# Patient Record
Sex: FEMALE | Race: WHITE | URBAN - METROPOLITAN AREA
[De-identification: names, ages, dates, MRNs, and addresses within clinical notes are randomized per-mention and may not be internally consistent; named-entity substitution may affect disease eponyms.]

---

## 2017-08-08 ENCOUNTER — COMMUNICATION - HEALTHEAST (OUTPATIENT)
Dept: INTERNAL MEDICINE | Facility: CLINIC | Age: 52
End: 2017-08-08

## 2017-08-15 ENCOUNTER — HOSPITAL ENCOUNTER (OUTPATIENT)
Dept: MAMMOGRAPHY | Facility: CLINIC | Age: 52
Discharge: HOME | End: 2017-08-15
Attending: FAMILY MEDICINE

## 2017-08-15 DIAGNOSIS — Z12.31 VISIT FOR SCREENING MAMMOGRAM: ICD-10-CM

## 2017-11-06 ENCOUNTER — OFFICE VISIT - HEALTHEAST (OUTPATIENT)
Dept: INTERNAL MEDICINE | Facility: CLINIC | Age: 52
End: 2017-11-06

## 2017-11-06 DIAGNOSIS — E66.9 OBESITY: ICD-10-CM

## 2017-11-06 DIAGNOSIS — E11.9 DIABETES (H): ICD-10-CM

## 2017-11-06 DIAGNOSIS — E55.9 VITAMIN D DEFICIENCY: ICD-10-CM

## 2017-11-06 DIAGNOSIS — I10 HYPERTENSION: ICD-10-CM

## 2017-11-06 ASSESSMENT — MIFFLIN-ST. JEOR: SCORE: 1489.11

## 2018-11-05 ENCOUNTER — OFFICE VISIT - HEALTHEAST (OUTPATIENT)
Dept: INTERNAL MEDICINE | Facility: CLINIC | Age: 53
End: 2018-11-05

## 2018-11-05 DIAGNOSIS — Z00.00 HEALTH CARE MAINTENANCE: ICD-10-CM

## 2018-11-05 ASSESSMENT — MIFFLIN-ST. JEOR: SCORE: 1524.03

## 2018-11-08 LAB
BKR LAB AP ABNORMAL BLEEDING: NO
BKR LAB AP BIRTH CONTROL/HORMONES: NORMAL
BKR LAB AP CERVICAL APPEARANCE: YES
BKR LAB AP GYN ADEQUACY: NORMAL
BKR LAB AP GYN INTERPRETATION: NORMAL
BKR LAB AP HPV REFLEX: NORMAL
BKR LAB AP LMP: NORMAL
BKR LAB AP PATIENT STATUS: NORMAL
BKR LAB AP PREVIOUS ABNORMAL: NORMAL
BKR LAB AP PREVIOUS NORMAL: NORMAL
HIGH RISK?: NO
PATH REPORT.COMMENTS IMP SPEC: NORMAL
RESULT FLAG (HE HISTORICAL CONVERSION): NORMAL

## 2019-04-12 ENCOUNTER — RECORDS - HEALTHEAST (OUTPATIENT)
Dept: ADMINISTRATIVE | Facility: OTHER | Age: 54
End: 2019-04-12

## 2019-08-28 ENCOUNTER — HOSPITAL ENCOUNTER (OUTPATIENT)
Dept: MAMMOGRAPHY | Facility: CLINIC | Age: 54
Discharge: HOME | End: 2019-08-28
Attending: INTERNAL MEDICINE

## 2019-08-28 DIAGNOSIS — Z12.31 VISIT FOR SCREENING MAMMOGRAM: ICD-10-CM

## 2019-09-03 ENCOUNTER — HOSPITAL ENCOUNTER (OUTPATIENT)
Dept: ULTRASOUND IMAGING | Facility: CLINIC | Age: 54
Discharge: HOME | End: 2019-09-03
Attending: INTERNAL MEDICINE

## 2019-09-03 ENCOUNTER — HOSPITAL ENCOUNTER (OUTPATIENT)
Dept: MAMMOGRAPHY | Facility: CLINIC | Age: 54
Discharge: HOME | End: 2019-09-03
Attending: INTERNAL MEDICINE

## 2019-09-03 DIAGNOSIS — N64.89 BREAST ASYMMETRY: ICD-10-CM

## 2019-09-14 ENCOUNTER — RECORDS - HEALTHEAST (OUTPATIENT)
Dept: ADMINISTRATIVE | Facility: OTHER | Age: 54
End: 2019-09-14

## 2020-06-30 ENCOUNTER — COMMUNICATION - HEALTHEAST (OUTPATIENT)
Dept: INTERNAL MEDICINE | Facility: CLINIC | Age: 55
End: 2020-06-30

## 2020-06-30 DIAGNOSIS — N60.01 BREAST CYST, RIGHT: ICD-10-CM

## 2020-06-30 DIAGNOSIS — Z00.00 ROUTINE GENERAL MEDICAL EXAMINATION AT A HEALTH CARE FACILITY: ICD-10-CM

## 2020-07-09 ENCOUNTER — OFFICE VISIT - HEALTHEAST (OUTPATIENT)
Dept: FAMILY MEDICINE | Facility: CLINIC | Age: 55
End: 2020-07-09

## 2020-07-09 DIAGNOSIS — Z12.4 SCREENING FOR MALIGNANT NEOPLASM OF CERVIX: ICD-10-CM

## 2020-07-10 LAB
HPV SOURCE: NORMAL
HUMAN PAPILLOMA VIRUS 16 DNA: NEGATIVE
HUMAN PAPILLOMA VIRUS 18 DNA: NEGATIVE
HUMAN PAPILLOMA VIRUS FINAL DIAGNOSIS: NORMAL
HUMAN PAPILLOMA VIRUS OTHER HR: NEGATIVE
SPECIMEN DESCRIPTION: NORMAL

## 2020-07-15 ENCOUNTER — RECORDS - HEALTHEAST (OUTPATIENT)
Dept: ADMINISTRATIVE | Facility: OTHER | Age: 55
End: 2020-07-15

## 2020-07-21 ENCOUNTER — HOSPITAL ENCOUNTER (OUTPATIENT)
Dept: MAMMOGRAPHY | Facility: CLINIC | Age: 55
Discharge: HOME | End: 2020-07-21
Attending: INTERNAL MEDICINE

## 2020-07-21 ENCOUNTER — HOSPITAL ENCOUNTER (OUTPATIENT)
Dept: ULTRASOUND IMAGING | Facility: CLINIC | Age: 55
Discharge: HOME | End: 2020-07-21
Attending: INTERNAL MEDICINE

## 2020-07-21 DIAGNOSIS — Z09 FOLLOW-UP EXAM, 3-6 MONTHS SINCE PREVIOUS EXAM: ICD-10-CM

## 2020-07-21 DIAGNOSIS — N60.01 BREAST CYST, RIGHT: ICD-10-CM

## 2020-08-18 ENCOUNTER — COMMUNICATION - HEALTHEAST (OUTPATIENT)
Dept: INTERNAL MEDICINE | Facility: CLINIC | Age: 55
End: 2020-08-18

## 2020-08-21 ENCOUNTER — OFFICE VISIT - HEALTHEAST (OUTPATIENT)
Dept: INTERNAL MEDICINE | Facility: CLINIC | Age: 55
End: 2020-08-21

## 2020-08-21 DIAGNOSIS — Z00.00 ROUTINE GENERAL MEDICAL EXAMINATION AT A HEALTH CARE FACILITY: ICD-10-CM

## 2020-08-21 DIAGNOSIS — Z71.6 ENCOUNTER FOR SMOKING CESSATION COUNSELING: ICD-10-CM

## 2020-08-21 DIAGNOSIS — E66.01 MORBID OBESITY (H): ICD-10-CM

## 2020-08-21 DIAGNOSIS — I10 ESSENTIAL HYPERTENSION: ICD-10-CM

## 2020-08-21 LAB
ALBUMIN SERPL-MCNC: 3.9 G/DL (ref 3.5–5)
ALP SERPL-CCNC: 66 U/L (ref 45–120)
ALT SERPL W P-5'-P-CCNC: 14 U/L (ref 0–45)
ANION GAP SERPL CALCULATED.3IONS-SCNC: 10 MMOL/L (ref 5–18)
AST SERPL W P-5'-P-CCNC: 12 U/L (ref 0–40)
BILIRUB SERPL-MCNC: 0.3 MG/DL (ref 0–1)
BUN SERPL-MCNC: 14 MG/DL (ref 8–22)
CALCIUM SERPL-MCNC: 9.2 MG/DL (ref 8.5–10.5)
CHLORIDE BLD-SCNC: 107 MMOL/L (ref 98–107)
CHOLEST SERPL-MCNC: 176 MG/DL
CO2 SERPL-SCNC: 24 MMOL/L (ref 22–31)
CREAT SERPL-MCNC: 0.77 MG/DL (ref 0.6–1.1)
ERYTHROCYTE [DISTWIDTH] IN BLOOD BY AUTOMATED COUNT: 11.8 % (ref 11–14.5)
FASTING STATUS PATIENT QL REPORTED: YES
GFR SERPL CREATININE-BSD FRML MDRD: >60 ML/MIN/1.73M2
GLUCOSE BLD-MCNC: 101 MG/DL (ref 70–125)
HCT VFR BLD AUTO: 36 % (ref 35–47)
HDLC SERPL-MCNC: 59 MG/DL
HGB BLD-MCNC: 12.5 G/DL (ref 12–16)
LDLC SERPL CALC-MCNC: 81 MG/DL
MCH RBC QN AUTO: 32.9 PG (ref 27–34)
MCHC RBC AUTO-ENTMCNC: 34.6 G/DL (ref 32–36)
MCV RBC AUTO: 95 FL (ref 80–100)
PLATELET # BLD AUTO: 298 THOU/UL (ref 140–440)
PMV BLD AUTO: 7.8 FL (ref 7–10)
POTASSIUM BLD-SCNC: 4.3 MMOL/L (ref 3.5–5)
PROT SERPL-MCNC: 6.7 G/DL (ref 6–8)
RBC # BLD AUTO: 3.79 MILL/UL (ref 3.8–5.4)
SODIUM SERPL-SCNC: 141 MMOL/L (ref 136–145)
TRIGL SERPL-MCNC: 182 MG/DL
WBC: 4.1 THOU/UL (ref 4–11)

## 2020-08-21 RX ORDER — TOPIRAMATE 25 MG/1
50 TABLET, FILM COATED ORAL EVERY MORNING
Status: SHIPPED | COMMUNITY
Start: 2020-08-05

## 2020-08-24 LAB — HCV AB SERPL QL IA: NEGATIVE

## 2020-09-01 ENCOUNTER — COMMUNICATION - HEALTHEAST (OUTPATIENT)
Dept: INTERNAL MEDICINE | Facility: CLINIC | Age: 55
End: 2020-09-01

## 2020-09-01 ENCOUNTER — COMMUNICATION - HEALTHEAST (OUTPATIENT)
Dept: SCHEDULING | Facility: CLINIC | Age: 55
End: 2020-09-01

## 2020-09-01 ENCOUNTER — OFFICE VISIT - HEALTHEAST (OUTPATIENT)
Dept: INTERNAL MEDICINE | Facility: CLINIC | Age: 55
End: 2020-09-01

## 2020-09-01 DIAGNOSIS — R60.9 EDEMA, UNSPECIFIED TYPE: ICD-10-CM

## 2020-09-01 DIAGNOSIS — I10 ESSENTIAL HYPERTENSION: ICD-10-CM

## 2020-09-01 RX ORDER — FUROSEMIDE 20 MG
20 TABLET ORAL DAILY PRN
Qty: 30 TABLET | Refills: 0 | Status: SHIPPED | OUTPATIENT
Start: 2020-09-01 | End: 2022-01-11

## 2020-09-15 ENCOUNTER — RECORDS - HEALTHEAST (OUTPATIENT)
Dept: ADMINISTRATIVE | Facility: OTHER | Age: 55
End: 2020-09-15

## 2020-10-06 ENCOUNTER — COMMUNICATION - HEALTHEAST (OUTPATIENT)
Dept: INTERNAL MEDICINE | Facility: CLINIC | Age: 55
End: 2020-10-06

## 2020-10-06 DIAGNOSIS — D17.30 LIPOMA OF SKIN AND SUBCUTANEOUS TISSUE: ICD-10-CM

## 2020-10-22 ENCOUNTER — OFFICE VISIT - HEALTHEAST (OUTPATIENT)
Dept: SURGERY | Facility: CLINIC | Age: 55
End: 2020-10-22

## 2020-10-22 DIAGNOSIS — R22.2 PALPABLE MASS OF LOWER BACK: ICD-10-CM

## 2020-10-26 LAB
LAB AP CHARGES (HE HISTORICAL CONVERSION): NORMAL
PATH REPORT.COMMENTS IMP SPEC: NORMAL
PATH REPORT.FINAL DX SPEC: NORMAL
PATH REPORT.GROSS SPEC: NORMAL
PATH REPORT.MICROSCOPIC SPEC OTHER STN: NORMAL
PATH REPORT.RELEVANT HX SPEC: NORMAL
RESULT FLAG (HE HISTORICAL CONVERSION): NORMAL

## 2020-11-03 ENCOUNTER — RECORDS - HEALTHEAST (OUTPATIENT)
Dept: ADMINISTRATIVE | Facility: OTHER | Age: 55
End: 2020-11-03

## 2021-02-15 ENCOUNTER — COMMUNICATION - HEALTHEAST (OUTPATIENT)
Dept: ADMINISTRATIVE | Facility: CLINIC | Age: 56
End: 2021-02-15

## 2021-02-15 DIAGNOSIS — J32.9 SINUSITIS, UNSPECIFIED CHRONICITY, UNSPECIFIED LOCATION: ICD-10-CM

## 2021-02-24 ENCOUNTER — COMMUNICATION - HEALTHEAST (OUTPATIENT)
Dept: ADMINISTRATIVE | Facility: CLINIC | Age: 56
End: 2021-02-24

## 2021-02-24 DIAGNOSIS — J32.9 SINUSITIS, UNSPECIFIED CHRONICITY, UNSPECIFIED LOCATION: ICD-10-CM

## 2021-03-01 ENCOUNTER — HOSPITAL ENCOUNTER (OUTPATIENT)
Dept: ULTRASOUND IMAGING | Facility: CLINIC | Age: 56
Discharge: HOME | End: 2021-03-01
Attending: INTERNAL MEDICINE

## 2021-03-01 ENCOUNTER — HOSPITAL ENCOUNTER (OUTPATIENT)
Dept: MAMMOGRAPHY | Facility: CLINIC | Age: 56
Discharge: HOME | End: 2021-03-01
Attending: INTERNAL MEDICINE

## 2021-03-01 DIAGNOSIS — Z09 FOLLOW UP: ICD-10-CM

## 2021-03-08 ENCOUNTER — COMMUNICATION - HEALTHEAST (OUTPATIENT)
Dept: INTERNAL MEDICINE | Facility: CLINIC | Age: 56
End: 2021-03-08

## 2021-03-08 ENCOUNTER — OFFICE VISIT - HEALTHEAST (OUTPATIENT)
Dept: INTERNAL MEDICINE | Facility: CLINIC | Age: 56
End: 2021-03-08

## 2021-03-08 DIAGNOSIS — E66.9 OBESITY WITHOUT SERIOUS COMORBIDITY, UNSPECIFIED CLASSIFICATION, UNSPECIFIED OBESITY TYPE: ICD-10-CM

## 2021-03-08 DIAGNOSIS — Z71.6 ENCOUNTER FOR SMOKING CESSATION COUNSELING: ICD-10-CM

## 2021-03-08 DIAGNOSIS — S83.249S TEAR OF MEDIAL MENISCUS OF KNEE, UNSPECIFIED LATERALITY, UNSPECIFIED TEAR TYPE, UNSPECIFIED WHETHER OLD OR CURRENT TEAR, SEQUELA: ICD-10-CM

## 2021-03-08 DIAGNOSIS — Z01.818 PRE-OP EXAM: ICD-10-CM

## 2021-03-08 DIAGNOSIS — I10 ESSENTIAL HYPERTENSION: ICD-10-CM

## 2021-03-08 LAB
ANION GAP SERPL CALCULATED.3IONS-SCNC: 9 MMOL/L (ref 5–18)
ATRIAL RATE - MUSE: 75 BPM
BUN SERPL-MCNC: 16 MG/DL (ref 8–22)
CALCIUM SERPL-MCNC: 9 MG/DL (ref 8.5–10.5)
CHLORIDE BLD-SCNC: 107 MMOL/L (ref 98–107)
CO2 SERPL-SCNC: 24 MMOL/L (ref 22–31)
CREAT SERPL-MCNC: 0.81 MG/DL (ref 0.6–1.1)
DIASTOLIC BLOOD PRESSURE - MUSE: NORMAL
GFR SERPL CREATININE-BSD FRML MDRD: >60 ML/MIN/1.73M2
GLUCOSE BLD-MCNC: 96 MG/DL (ref 70–125)
INTERPRETATION ECG - MUSE: NORMAL
P AXIS - MUSE: 56 DEGREES
POTASSIUM BLD-SCNC: 4.3 MMOL/L (ref 3.5–5)
PR INTERVAL - MUSE: 136 MS
QRS DURATION - MUSE: 98 MS
QT - MUSE: 388 MS
QTC - MUSE: 433 MS
R AXIS - MUSE: 7 DEGREES
SODIUM SERPL-SCNC: 140 MMOL/L (ref 136–145)
SYSTOLIC BLOOD PRESSURE - MUSE: NORMAL
T AXIS - MUSE: 38 DEGREES
VENTRICULAR RATE- MUSE: 75 BPM

## 2021-03-08 RX ORDER — VARENICLINE TARTRATE 1 MG/1
TABLET, FILM COATED ORAL
Qty: 60 TABLET | Refills: 2 | Status: SHIPPED | OUTPATIENT
Start: 2021-03-08 | End: 2022-01-11

## 2021-03-12 ENCOUNTER — COMMUNICATION - HEALTHEAST (OUTPATIENT)
Dept: ADMINISTRATIVE | Facility: CLINIC | Age: 56
End: 2021-03-12

## 2021-03-15 ENCOUNTER — RECORDS - HEALTHEAST (OUTPATIENT)
Dept: ADMINISTRATIVE | Facility: OTHER | Age: 56
End: 2021-03-15

## 2021-05-29 ENCOUNTER — RECORDS - HEALTHEAST (OUTPATIENT)
Dept: ADMINISTRATIVE | Facility: CLINIC | Age: 56
End: 2021-05-29

## 2021-05-31 ENCOUNTER — RECORDS - HEALTHEAST (OUTPATIENT)
Dept: ADMINISTRATIVE | Facility: CLINIC | Age: 56
End: 2021-05-31

## 2021-05-31 VITALS — WEIGHT: 207 LBS | HEIGHT: 63 IN | BODY MASS INDEX: 36.68 KG/M2

## 2021-06-02 ENCOUNTER — RECORDS - HEALTHEAST (OUTPATIENT)
Dept: ADMINISTRATIVE | Facility: CLINIC | Age: 56
End: 2021-06-02

## 2021-06-02 VITALS — BODY MASS INDEX: 38.04 KG/M2 | HEIGHT: 63 IN | WEIGHT: 214.7 LBS

## 2021-06-04 VITALS
SYSTOLIC BLOOD PRESSURE: 128 MMHG | DIASTOLIC BLOOD PRESSURE: 82 MMHG | OXYGEN SATURATION: 97 % | BODY MASS INDEX: 34.46 KG/M2 | WEIGHT: 193 LBS | HEART RATE: 97 BPM

## 2021-06-04 VITALS
DIASTOLIC BLOOD PRESSURE: 83 MMHG | HEART RATE: 74 BPM | BODY MASS INDEX: 33.93 KG/M2 | SYSTOLIC BLOOD PRESSURE: 122 MMHG | WEIGHT: 190 LBS

## 2021-06-04 VITALS
SYSTOLIC BLOOD PRESSURE: 118 MMHG | HEART RATE: 68 BPM | DIASTOLIC BLOOD PRESSURE: 78 MMHG | BODY MASS INDEX: 33.93 KG/M2 | WEIGHT: 190 LBS

## 2021-06-05 VITALS
HEART RATE: 80 BPM | DIASTOLIC BLOOD PRESSURE: 78 MMHG | BODY MASS INDEX: 35.35 KG/M2 | SYSTOLIC BLOOD PRESSURE: 132 MMHG | WEIGHT: 198 LBS

## 2021-06-09 NOTE — TELEPHONE ENCOUNTER
Orders being requested: patient is not sure if she needs repeat US to breasts or 3D mammogram.  The result letter from [facility] states it is up to the provider. dated 9/3/19. Leigh would like to schedule in July now that the pandemic has advanced.  She was due for follow up . Please advise.   Reason service is needed/diagnosis:  abnormal mammogram  When are orders needed by: asap  Where to send Orders: in chart. [facility] related to patient they need a new order.   Okay to leave detailed message?  Yes

## 2021-06-09 NOTE — PROGRESS NOTES
Assessment/Plan:     1. Screening for malignant neoplasm of cervix  Pap pending.  Will call or send letter with results.  If normal, recommend repeating in 5 years.  Discussed menopausal symptoms.    - Gynecologic Cytology (PAP Smear)        Subjective:     Mrago Jones is a 55 y.o. female who presents for a pelvic and pap exam.  She is scheduled for a physical with her PCP next month.  Patient's last Pap was about 2 years ago.  We did discuss that these only need to be repeated every 3 to 5 years, but patient prefers to do this every 2 years.  States she was on a lot of infertility medications, and feels better with more frequent screening.  Last Pap and HPV testing were both negative.  No history of abnormal.  No personal or family history of uterine or cervical cancer.  Last menstrual period was about 6 months ago.  She is starting to get some hot flashes and night sweats.  She is able to manage these at this time.  Denies any abnormal vaginal discharge or pelvic pain.      The following portions of the patient's history were reviewed and updated as appropriate: allergies, current medications.    Review of Systems  A comprehensive review of systems was performed and was otherwise negative    Objective:     /78   Pulse 68   Wt 190 lb (86.2 kg)   LMP 08/19/2019   BMI 33.93 kg/m      General Appearance: Alert, cooperative, no distress, appears stated age  Pelvic: Normally developed genitalia with no external lesions or eruptions. Vagina and cervix show no lesions, inflammation, discharge or tenderness. No cystocele, No rectocele. No adnexal mass or tenderness.        Carmella Alston NP

## 2021-06-10 NOTE — TELEPHONE ENCOUNTER
New Appointment Needed  What is the reason for the visit:    Annual Phys   Provider Preference: PCP only  How soon do you need to be seen?: 8/24 4:20 appointment for spouse is already scheduled. Patient is requesting a back to back  Waitlist offered?: No  Okay to leave a detailed message:  Yes

## 2021-06-10 NOTE — PROGRESS NOTES
Assessment:      Healthy female exam.      Smoking cessation    Obesity, with a 25 pound weight loss over the last year and a half.  She continues to see [facility] for this.       Plan:     Check a CBC, CMP, lipid profile, and hep C antibody.  Colon cancer screening is up-to-date.  Mammogram is up-to-date.  Pap is up-to-date.  Follow-up 1 year, earlier if needed.    Subjective:      Margo Jones is a 55 y.o. female who presents for an annual exam.  She is overall feeling well today and has no acute complaints.  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  She says that there is a lot of stress between the teachers and administration because of the coronavirus pandemic which is completely understandable.  Her weight has gone down significantly over the last couple of years.  Blood pressure is under excellent control today.      Immunization History   Administered Date(s) Administered     DT (pediatric) 2005     Influenza [id number], 2009     Influenza, seasonal,quad inj 6-35 mos 2013     Td,adult,historic,unspecified 2005     Tdap 2013     Immunization status: up to date and documented.    No exam data present    OB History    Para Term  AB Living   2 2 2         SAB TAB Ectopic Multiple Live Births                  # Outcome Date GA Lbr Martin/2nd Weight Sex Delivery Anes PTL Lv   2 Term            1 Term                Current Outpatient Medications   Medication Sig Dispense Refill     losartan (COZAAR) 25 MG tablet Take 25 mg by mouth daily.       metFORMIN (GLUCOPHAGE-XR) 500 MG 24 hr tablet Take 2,000 mg by mouth bedtime.       multivitamin with minerals (THERA-M) 9 mg iron-400 mcg Tab tablet Take 1 tablet by mouth daily.       naproxen sodium (ALEVE ORAL) Take by mouth 2 (two) times a day.       phentermine 30 MG capsule Take 45 mg by mouth every morning.       simvastatin  (ZOCOR) 20 MG tablet Take 20 mg by mouth bedtime.       topiramate (TOPAMAX) 25 MG tablet Take 50 mg by mouth daily before breakfast.       varenicline (CHANTIX STARTING MONTH BOX) 0.5 mg (11)- 1 mg (42) tablet 1 wk before you stop smoking take 0.5mg daily on days 1-3, 0.5mg 2 times each day on days 4-7, then 1mg 2 times daily. 53 tablet 0     varenicline (CHANTIX) 1 mg tablet Take 1 tab by mouth two times a day. Take after eating with a full glass of water. NOTE: Dispense as maintenance for refills only. 60 tablet 2     No current facility-administered medications for this visit.      No past medical history on file.  Past Surgical History:   Procedure Laterality Date     ND LIGATE FALLOPIAN TUBE      Description: Tubal Ligation;  Recorded: 2010;     Patient has no known allergies.  Family History   Problem Relation Age of Onset     No Medical Problems Mother      Colon cancer Father      No Medical Problems Sister      No Medical Problems Brother      No Medical Problems Daughter      No Medical Problems Son      Heart attack Paternal Grandfather      No Medical Problems Brother      No Medical Problems Sister      Social History     Socioeconomic History     Marital status: Patient Declined     Spouse name: Not on file     Number of children: Not on file     Years of education: Not on file     Highest education level: Not on file   Occupational History     Not on file   Social Needs     Financial resource strain: Not on file     Food insecurity     Worry: Not on file     Inability: Not on file     Transportation needs     Medical: Not on file     Non-medical: Not on file   Tobacco Use     Smoking status: Former Smoker     Packs/day: 0.25     Years: 30.00     Pack years: 7.50     Last attempt to quit: 2/3/2020     Years since quittin.5     Smokeless tobacco: Never Used     Tobacco comment: Smokes 5 cigarettes daily, plan to quite after surgery   Substance and Sexual Activity     Alcohol use: Yes      Alcohol/week: 14.0 standard drinks     Types: 14 Glasses of wine per week     Drug use: No     Sexual activity: Yes     Partners: Male     Birth control/protection: None   Lifestyle     Physical activity     Days per week: Not on file     Minutes per session: Not on file     Stress: Not on file   Relationships     Social connections     Talks on phone: Not on file     Gets together: Not on file     Attends Congregational service: Not on file     Active member of club or organization: Not on file     Attends meetings of clubs or organizations: Not on file     Relationship status: Not on file     Intimate partner violence     Fear of current or ex partner: Not on file     Emotionally abused: Not on file     Physically abused: Not on file     Forced sexual activity: Not on file   Other Topics Concern     Not on file   Social History Narrative     Not on file       Review of Systems  General:  Denies problem  Eyes: Denies problem  Ears/Nose/Throat: Denies problem  Cardiovascular: Denies problem  Respiratory:  Denies problem  Gastrointestinal:  Denies problem, Genitourinary: Denies problem  Musculoskeletal:  Denies problem  Skin: Denies problem  Neurologic: Denies problem  Psychiatric: Denies problem  Endocrine: Denies problem  Heme/Lymphatic: Denies problem   Allergic/Immunologic: Denies problem        Objective:         Vitals:    08/21/20 0934   BP: 122/83   Pulse: 74   Weight: 190 lb (86.2 kg)     Body mass index is 33.93 kg/m .    Physical Exam:  General Appearance: Alert, cooperative, no distress, appears stated age  Head: Normocephalic, without obvious abnormality, atraumatic  Eyes: PERRL, conjunctiva/corneas clear, EOM's intact  Ears: Normal TM's and external ear canals, both ears  Nose: Nares normal, septum midline,mucosa normal, no drainage  Throat: Lips, mucosa, and tongue normal; teeth and gums normal  Neck: Supple, symmetrical, trachea midline, no adenopathy;  thyroid: not enlarged, symmetric, no  tenderness/mass/nodules; no carotid bruit or JVD  Back: Symmetric, no curvature, ROM normal, no CVA tenderness  Lungs: Clear to auscultation bilaterally, respirations unlabored  Breasts: No breast masses, tenderness, asymmetry, or nipple discharge.  Heart: Regular rate and rhythm, S1 and S2 normal, no murmur, rub, or gallop, Abdomen: Soft, non-tender, bowel sounds active all four quadrants,  no masses, no organomegaly  Pelvic:Not examined  Extremities: Extremities normal, atraumatic, no cyanosis or edema  Skin: Skin color, texture, turgor normal, no rashes or lesions  Lymph nodes: Cervical, supraclavicular, and axillary nodes normal  Neurologic: Normal

## 2021-06-11 NOTE — PROGRESS NOTES
ASSESSMENT and PLAN:    #1.  Lower extremity edema likely secondary to venous stasis.  Since compression has not worked well for her and she has given it a good try I am going to send her in a prescription for Lasix to be used as needed.  She was instructed that she should use this only until the discomfort in the legs abates.  Otherwise follow-up as needed.    2.  Hypertension, controlled.    Problem List Items Addressed This Visit     Hypertension    Relevant Medications    furosemide (LASIX) 20 MG tablet      Other Visit Diagnoses     Edema, unspecified type    -  Primary    Relevant Medications    furosemide (LASIX) 20 MG tablet          There are no Patient Instructions on file for this visit.    Medications Discontinued During This Encounter   Medication Reason     furosemide (LASIX) 20 MG tablet        No follow-ups on file.    CHIEF COMPLAINT:  Chief Complaint   Patient presents with     Knee Pain     Right x 6 weeks - Steroids improved - MRI scheduled for Friday     Leg Swelling     Ortho wants edema under control       HISTORY OF PRESENT ILLNESS:  Margo Jones is a 55 y.o. female  presenting to the clinic today for evaluation of lower extremity edema.  Leigh states that this is been going on for the last few months, and always gets worse around the fall.  She states that the edema has got to the point where she is having some pain in the legs because of the skin stretching.  She denies any erythema.  No fevers or shortness of breath.  No abdominal pain.  She has worn compression stockings for this in the past which have not worked.  She is wearing them today but is still having a little success with getting the edema down.  She is otherwise doing well.    REVIEW OF SYSTEMS:   Pertinent positives noted in HPI, remainder of ROS is negative.    MEDICATIONS:  Current Outpatient Medications   Medication Sig Dispense Refill     losartan (COZAAR) 25 MG tablet Take 25 mg by mouth daily.       metFORMIN  (GLUCOPHAGE-XR) 500 MG 24 hr tablet Take 2,000 mg by mouth bedtime.       multivitamin with minerals (THERA-M) 9 mg iron-400 mcg Tab tablet Take 1 tablet by mouth daily.       naproxen sodium (ALEVE ORAL) Take by mouth 2 (two) times a day.       phentermine 30 MG capsule Take 45 mg by mouth every morning.       simvastatin (ZOCOR) 20 MG tablet Take 20 mg by mouth bedtime.       topiramate (TOPAMAX) 25 MG tablet Take 50 mg by mouth daily before breakfast.       varenicline (CHANTIX STARTING MONTH BOX) 0.5 mg (11)- 1 mg (42) tablet 1 wk before you stop smoking take 0.5mg daily on days 1-3, 0.5mg 2 times each day on days 4-7, then 1mg 2 times daily. 53 tablet 0     varenicline (CHANTIX) 1 mg tablet Take 1 tab by mouth two times a day. Take after eating with a full glass of water. NOTE: Dispense as maintenance for refills only. 60 tablet 2     furosemide (LASIX) 20 MG tablet Take 1 tablet (20 mg total) by mouth daily as needed. 30 tablet 0     No current facility-administered medications for this visit.        TOBACCO USE:  Social History     Tobacco Use   Smoking Status Current Every Day Smoker     Packs/day: 0.25     Years: 30.00     Pack years: 7.50     Last attempt to quit: 2/3/2020     Years since quittin.5   Smokeless Tobacco Never Used   Tobacco Comment    Smokes 5 cigarettes daily, plan to quite after surgery       VITALS:  Vitals:    20 1447   BP: 128/82   Patient Site: Right Arm   Patient Position: Sitting   Cuff Size: Adult Large   Pulse: 97   SpO2: 97%   Weight: 193 lb (87.5 kg)     Wt Readings from Last 3 Encounters:   20 193 lb (87.5 kg)   20 190 lb (86.2 kg)   20 190 lb (86.2 kg)         PHYSICAL EXAM:  Constitutional:  Reveals an alert, pleasant female.   HEET: Normocephalic, without obvious abnormality, atraumatic. PERRL, conjunctiva/corneas clear, EOM's intact. External canals, TMs clear.   Neurologic: Normal gait and station  Psychologic: Normal affect  Extremities: 1-2+  pitting edema in the lower extremities bilaterally.  DP pulses are 2+ and normal bilaterally.

## 2021-06-11 NOTE — TELEPHONE ENCOUNTER
Calling because of swelling both legs up to and including the knees/ did injure R knee and saw an orthopedist today who recommended she be seen for the swelling/ right is worse than left/ calf is painful and swelling of her left leg swelling goes  down at night but right does not / as per guidelines/ recommend she be seen today/ she will go to the walk in clinic  .[email or web address]    Reason for Disposition    MODERATE swelling of both ankles (e.g., swelling extends up to the knees) AND new onset or worsening    Additional Information    Negative: Sounds like a life-threatening emergency to the triager    Negative: Chest pain    Negative: Small area of swelling and followed an insect bite to the area    Negative: Followed a knee injury    Negative: Ankle or foot injury    Negative: Pregnant with leg swelling or edema    Negative: Difficulty breathing at rest    Negative: Entire foot is cool or blue in comparison to other side    Negative: SEVERE swelling (e.g., swelling extends above knee, entire leg is swollen, weeping fluid)    Negative: Thigh or calf pain and only 1 side and present > 1 hour    Negative: Thigh, calf, or ankle swelling in only one leg    Negative: Thigh, calf, or ankle swelling in both legs, but one side is definitely more swollen    Negative: Cast on leg or ankle and has increasing pain    Negative: Can't walk or can barely stand (new onset)    Negative: Fever and red area (or area very tender to touch)    Negative: Patient sounds very sick or weak to the triager    Negative: Swelling of face, arm or hands (Exception: slight puffiness of fingers during hot weather)    Negative: Pregnant > 20 weeks and sudden weight gain (i.e., > 2 lbs, 1 kg in one week)    Protocols used: LEG SWELLING AND EDEMA-A-OH  COVID 19 Nurse Triage Plan/Patient Instructions    Please be aware that novel coronavirus (COVID-19) may be circulating in the community. If you develop symptoms such as fever, cough, or SOB or if you  have concerns about the presence of another infection including coronavirus (COVID-19), please contact your health care provider or visit [email or web address].     Disposition/Instructions    In-Person Visit with provider recommended. Reference Visit Selection Guide.    Thank you for taking steps to prevent the spread of this virus.  o Limit your contact with others.  o Wear a simple mask to cover your cough.  o Wash your hands well and often.    Resources    [facility]: About COVID-19: [email or web address]    CDC: What to Do If You're Sick: www.cdc.gov/coronavirus/2019-ncov/about/steps-when-sick.html    CDC: Ending Home Isolation: www.cdc.gov/coronavirus/2019-ncov/hcp/disposition-in-home-patients.html     CDC: Caring for Someone: www.cdc.gov/coronavirus/2019-ncov/if-you-are-sick/care-for-someone.html     [facility]: Interim Guidance for Hospital Discharge to Home: [email or web address]/hcp/[email or web address]    [facility] clinical trials (COVID-19 research studies): clinicalaffairs.[facility]-clinical-trials     Below are the COVID-19 hotlines at the [place] [facility] ([facility]). Interpreters are available.   o For health questions: Call 094-111-0502 or 1-648.523.7644 (7 a.m. to 7 p.m.)  o For questions about schools and childcare: Call 444-701-7731 or 1-508.629.9705 (7 a.m. to 7 p.m.)

## 2021-06-12 NOTE — TELEPHONE ENCOUNTER
If it's not bothering her or growing, then nothing.  If it is, then we need to refer her to surgery for excision

## 2021-06-12 NOTE — PROGRESS NOTES
History:   Margo Jones is a 55 y.o. female referred to general surgery for a subcutaneous mass over her right SI joint.  This has been present for at least 2 years.  It was initially identified by her chiropractor.  She believes that it has grown over these last 2 years.  It has never become red, infected, or drained.  She recently returned to her chiropractor who noted that the lesion was bigger.  The patient is interested in having the lesion excised.    Allergies:  Patient has no known allergies.    Past medical history:  Hyperlipidemia  Diabetes  Hypertension  Obesity    Past surgical history:  Tubal ligation    Medications:     furosemide (LASIX) 20 MG tablet, Take 1 tablet (20 mg total) by mouth daily as needed., Disp: 30 tablet, Rfl: 0     losartan (COZAAR) 25 MG tablet, Take 25 mg by mouth daily., Disp: , Rfl:      metFORMIN (GLUCOPHAGE-XR) 500 MG 24 hr tablet, Take 2,000 mg by mouth bedtime., Disp: , Rfl:      multivitamin with minerals (THERA-M) 9 mg iron-400 mcg Tab tablet, Take 1 tablet by mouth daily., Disp: , Rfl:      naproxen sodium (ALEVE ORAL), Take by mouth 2 (two) times a day., Disp: , Rfl:      phentermine 30 MG capsule, Take 45 mg by mouth every morning., Disp: , Rfl:      simvastatin (ZOCOR) 20 MG tablet, Take 20 mg by mouth bedtime., Disp: , Rfl:      topiramate (TOPAMAX) 25 MG tablet, Take 50 mg by mouth daily before breakfast., Disp: , Rfl:      varenicline (CHANTIX STARTING MONTH BOX) 0.5 mg (11)- 1 mg (42) tablet, 1 wk before you stop smoking take 0.5mg daily on days 1-3, 0.5mg 2 times each day on days 4-7, then 1mg 2 times daily., Disp: 53 tablet, Rfl: 0     varenicline (CHANTIX) 1 mg tablet, Take 1 tab by mouth two times a day. Take after eating with a full glass of water. NOTE: Dispense as maintenance for refills only., Disp: 60 tablet, Rfl: 2    Family history:  Family History   Problem Relation Age of Onset     No Medical Problems Mother      Colon cancer Father      No Medical  Problems Sister      No Medical Problems Brother      No Medical Problems Daughter      No Medical Problems Son      Heart attack Paternal Grandfather      No Medical Problems Brother      No Medical Problems Sister        Social history:  Reports that she has been smoking. She has a 7.50 pack-year smoking history. She has never used smokeless tobacco. She reports current alcohol use of about 14.0 standard drinks of alcohol per week. She reports that she does not use drugs.    Review of Systems:   General: No complaints or constitutional symptoms  Skin: Mass over right SI joint  Hematologic/Lymphatic: No symptoms or complaints  Psychiatric: No symptoms or complaints  Endocrine: No excessive fatigue, no hypermetabolic symptoms reported  Respiratory: No cough, shortness of breath, or wheezing  Cardiovascular: No chest pain or dyspnea on exertion  Gastrointestinal: No acute abdominal pain, nausea, diarrhea or constipation  Musculoskeletal: No recent injuries reported  Neurological: No focal neurologic defects reported    Exam:  LMP 08/19/2019   Breastfeeding No   There is no height or weight on file to calculate BMI.  General : Alert, cooperative, appears stated age   Skin: There is an approximate 2 cm subcutaneous mass deep within the tissue over the right SI joint.  It is soft and easily mobile.  Lymphatic: No obvious adenopathy, no swelling   Eyes: No scleral icterus, pupils equal  HENT: No traumatic injury to the head or face, no gross abnormalities  Lungs: Normal respiratory effort, breath sounds equal bilaterally  Heart: Regular rate and rhythm  Abdomen: Soft, non-distended, non-tender to palpation  Musculoskeletal: No obvious swelling  Neurologic: Grossly intact    Assessment/Plan:   Margo Jones is a 55 y.o. female with a subcutaneous mass involving the tissue over her right SI joint.  She would like to have this removed.  I discussed that I would feel comfortable removing this lesion in the office under  local anesthetic.    Therefore, informed consent was obtained.  The patient was placed in the prone position.  The skin over the lesion was marked superficial to the right SI joint.  The skin was prepped with Betadine.  10 cc of 1% lidocaine with epinephrine was injected over the lesion.  A transverse incision was made.  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  The mass was removed in a few pieces.  It measured approximately 4 x 2 cm.  Hemostasis was assured in the cavity.  The incision was then closed in a layered fashion utilizing interrupted 3-0 Vicryl for the deep dermis followed by a running subcuticular 4-0 Monocryl and Exofin glue.    She can return to activities as tolerated.    It is okay to shower over the glue.    Ice for the next 2 days intermittently.    Tylenol and ibuprofen for discomfort.    Return to general surgery clinic as needed.    Lela Mcfadden DO  General Surgeon  [facility]  Surgery [facility] - [address] [address]  Office: [phone]  Employed by - [employer]

## 2021-06-12 NOTE — TELEPHONE ENCOUNTER
Patient states this is becoming bothersome. Order for general surgery pended.  Katelyn Fan CMA ............... 2:55 PM, 10/06/20

## 2021-06-12 NOTE — TELEPHONE ENCOUNTER
Who is calling:  Patient  Reason for Call:  Patient stated she has a Lipoma over her SI Joint after receiving Chiropractic care. Questioning what steps she should take moving forwarded. Please advise.  Date of last appointment with primary care: 09/01/2020  Okay to leave a detailed message: Yes

## 2021-06-14 NOTE — PROGRESS NOTES
Leigh comes in today to establish care as well as for a physical exam.  Please see the physical exam forms a and B for further details, including a complete review of systems.    ILLNESSES, HOSPITALIZATIONS, AND OPERATIONS:    #1.  Impaired fasting glucose, on metformin.  2.  Hyperlipidemia, on simvastatin.  3.  History of vitamin D deficiency  4.  Obesity, currently on phentermine.  This is prescribed by YARED.    Leigh is a very pleasant 52-year-old who presents today to establish care.  No acute concerns.  She has been through a few primary care doctors in the last few years and is concerned that I may be leaving soon.  I reassured her I would not be going anywhere as long as I am able to take care of patients to best of my ability.  She is otherwise feeling well.    OBJECTIVE:    In general the patient is alert pleasant and in no acute distress.    HEENT: Pupils equal round reactive light.  Oropharynx is clear.  Lymphatic shows no anterior posterior cervical lymphadenopathy.  No thyromegaly or other masses noted.    Cardiovascular: S1-S2 regular in rhythm no murmurs gallops rubs    Lungs are clear    Abdomen is soft nontender nondistended.  No HSM.    Extremities show no pedal edema present bilaterally.  DP pulses are 2+ and normal bilaterally.    Assessment and plan: Physical exam along with healthcare maintenance.  Colon cancer screening is up-to-date, cervical cancer screening up-to-date, breast cancer screening up-to-date.  Her obesity and impaired fasting glucose handled by her endocrinologist.  I will get her most recent labs from her endocrinologist.  Vaccinations are up-to-date.  Follow-up 1 year, earlier if needed.

## 2021-06-15 NOTE — TELEPHONE ENCOUNTER
Spoke with pt, her first dose was a Moderna so we would be unable to get her in anyway since we have Pfizer. But pt would like to know how long she should wait before starting the series over and doing Pfizer instead? Pt understands PCP is out of clinic today. Would like reply sent to her via [employer]

## 2021-06-15 NOTE — PROGRESS NOTES
[facility]  [address] [place] [id number]  Dept: [phone]  Dept Fax: [phone]  Primary Provider: Caesar Russ MD  Pre-op Performing Provider: CAESAR RUSS    PREOPERATIVE EVALUATION:  Today's date: 3/8/2021    Margo Jones is a 55 y.o. female who presents for a preoperative evaluation.    Surgical Information:  Surgery/Procedure: Right Knee Arthroscopy   Surgery Location: [facility]   Surgeon: Dr. Yang   Surgery Date: 3/15/2021  Where patient plans to recover: At home with family  Fax number for surgical facility: [phone]    Type of Anesthesia Anticipated: to be determined    Assessment & Plan     The proposed surgical procedure is considered INTERMEDIATE risk.    Problem List Items Addressed This Visit     Obesity    Hypertension      Other Visit Diagnoses     Pre-op exam    -  Primary    Relevant Orders    Electrocardiogram Perform and Read (Completed)    Basic Metabolic Panel    Tear of medial meniscus of knee, unspecified laterality, unspecified tear type, unspecified whether old or current tear, sequela              RECOMMENDATION:  APPROVAL GIVEN to proceed with proposed procedure, without further diagnostic evaluation.  Hold aspirin and NSAIDs prior to the surgery.  She takes her Metformin and losartan in the evenings and thus she does not need to hold these in the day of surgery.          Subjective     HPI related to upcoming procedure: Leigh comes in today for preop exam for a right medial meniscectomy.  She suffered a medial meniscus tear and has failed nonoperative treatment.  She is going to undergo operative intervention for definitive treatment.  She is feeling well today.  She has a history of hypertension, on losartan.  She is also on both Metformin and phentermine for weight loss.  She takes both her losartan and Metformin in the evenings.  Otherwise doing well.  No underlying lung disease or obstructive sleep  apnea.    Preop Questions 3/8/2021   Have you ever had a heart attack or stroke? No   Have you ever had surgery on your heart or blood vessels, such as a stent placement, a coronary artery bypass, or surgery on an artery in your head, neck, heart, or legs? No   Do you have chest pain with activity? No   Do you have a history of  heart failure? No   Do you currently have a cold, bronchitis or symptoms of other infection? No   Do you have a cough, shortness of breath, or wheezing? No   Do you or anyone in your family have previous history of blood clots? No   Do you or does anyone in your family have a serious bleeding problem such as prolonged bleeding following surgeries or cuts? No   Have you ever had problems with anemia or been told to take iron pills? No   Have you had any abnormal blood loss such as black, tarry or bloody stools, or abnormal vaginal bleeding? No   Have you ever had a blood transfusion? No   Are you willing to have a blood transfusion if it is medically needed before, during, or after your surgery? Yes   Have you or any of your relatives ever had problems with anesthesia? No   Do you have sleep apnea, excessive snoring or daytime drowsiness? No   Do you have any artifical heart valves or other implanted medical devices like a pacemaker, defibrillator, or continuous glucose monitor? No   Do you have artificial joints? No   Are you allergic to latex? No   Is there any chance that you may be pregnant? No           Review of Systems  Constitutional, neuro, ENT, endocrine, pulmonary, cardiac, gastrointestinal, genitourinary, musculoskeletal, integument and psychiatric systems are negative, except as otherwise noted.      Patient Active Problem List    Diagnosis Date Noted     Obesity (BMI 35.0-39.9) with comorbidity (H) 11/05/2018     Hypertension 11/06/2017     Obesity 11/06/2017     Vitamin D deficiency 11/06/2017     No past medical history on file.  Past Surgical History:   Procedure Laterality  Date     MT LIGATE FALLOPIAN TUBE      Description: Tubal Ligation;  Recorded: 2010;     Current Outpatient Medications   Medication Sig Dispense Refill     furosemide (LASIX) 20 MG tablet Take 1 tablet (20 mg total) by mouth daily as needed. 30 tablet 0     losartan (COZAAR) 25 MG tablet Take 25 mg by mouth daily.       metFORMIN (GLUCOPHAGE-XR) 500 MG 24 hr tablet Take 2,000 mg by mouth bedtime.       multivitamin with minerals (THERA-M) 9 mg iron-400 mcg Tab tablet Take 1 tablet by mouth daily.       naproxen sodium (ALEVE ORAL) Take by mouth 2 (two) times a day.       phentermine 30 MG capsule Take 45 mg by mouth every morning.       simvastatin (ZOCOR) 20 MG tablet Take 20 mg by mouth bedtime.       topiramate (TOPAMAX) 25 MG tablet Take 50 mg by mouth daily before breakfast.       varenicline (CHANTIX STARTING MONTH BOX) 0.5 mg (11)- 1 mg (42) tablet 1 wk before you stop smoking take 0.5mg daily on days 1-3, 0.5mg 2 times each day on days 4-7, then 1mg 2 times daily. 53 tablet 0     varenicline (CHANTIX) 1 mg tablet Take 1 tab by mouth two times a day. Take after eating with a full glass of water. NOTE: Dispense as maintenance for refills only. 60 tablet 2     No current facility-administered medications for this visit.        No Known Allergies    Social History     Tobacco Use     Smoking status: Current Every Day Smoker     Packs/day: 0.25     Years: 30.00     Pack years: 7.50     Last attempt to quit: 2/3/2020     Years since quittin.0     Smokeless tobacco: Never Used     Tobacco comment: Smokes 5 cigarettes daily, plan to quite after surgery   Substance Use Topics     Alcohol use: Yes     Alcohol/week: 14.0 standard drinks     Types: 14 Glasses of wine per week      Family History   Problem Relation Age of Onset     No Medical Problems Mother      Colon cancer Father      No Medical Problems Sister      No Medical Problems Brother      No Medical Problems Daughter      No Medical Problems Son       Heart attack Paternal Grandfather      No Medical Problems Brother      No Medical Problems Sister      Social History     Substance and Sexual Activity   Drug Use No        Objective     LMP 08/19/2019   Physical Exam    GENERAL APPEARANCE: healthy, alert and no distress     EYES: EOMI, PERRL     HENT: ear canals and TM's normal and nose and mouth without ulcers or lesions     NECK: no adenopathy, no asymmetry, masses, or scars and thyroid normal to palpation     RESP: lungs clear to auscultation - no rales, rhonchi or wheezes     CV: regular rates and rhythm, normal S1 S2, no S3 or S4 and no murmur, click or rub     ABDOMEN:  soft, nontender, no HSM or masses and bowel sounds normal     MS: extremities normal- no gross deformities noted, no evidence of inflammation in joints, FROM in all extremities.     SKIN: no suspicious lesions or rashes     NEURO: Normal strength and tone, sensory exam grossly normal, mentation intact and speech normal     PSYCH: mentation appears normal. and affect normal/bright     LYMPHATICS: No cervical adenopathy    Recent Labs   Lab Test 08/21/20  1033   HGB 12.5         K 4.3   CREATININE 0.77        PRE-OP Diagnostics:   Check BMP today.    EKG: Normal Sinus Rhythm, normal axis, normal intervals, no acute ST/T changes c/w ischemia    REVISED CARDIAC RISK INDEX (RCRI)   The patient has the following serious cardiovascular risks for perioperative complications:   - No serious cardiac risks = 0 points    RCRI INTERPRETATION: 0 points: Class I (very low risk - 0.4% complication rate)           Signed Electronically by: Caesar Russ MD    Copy of this evaluation report is provided to requesting physician.    [facility] Preop Guidelines    Revised Cardiac Risk Index

## 2021-06-15 NOTE — TELEPHONE ENCOUNTER
Reason for call:  Patient reporting a symptom    Symptom or request: Pt would like to start on an antibiotic today for a sinus infection, since last Thurs her face is getting casas and casas, Yesterday she had a bad headache, this am she woke up with a puffy face, her ears/teeth and face hurts. 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     Duration (how long have symptoms been present): 3wks    Have you been treated for this before? NO    Additional comments: [facility] in [place]    Phone Number patient can be reached at:    Cell number on file:    Telephone Information:   Mobile [phone]       Best Time:  anytime    Can we leave a detailed message on this number: Yes    Call taken on 2/15/2021 at 10:29 AM by Mica Lopez

## 2021-06-15 NOTE — TELEPHONE ENCOUNTER
Reason for Call:  Medication or medication refill:    Do you use a [facility]?  Name of the pharmacy and phone number for the current request: [facility]    Name of the medication requested: Augmentin    Other request: Pt was prescribed this med for her sinus infection, infection is almost gone but not totally, still has crackling in ears and snotty congestion in sinus.     Can we leave a detailed message on this number? Yes    Phone number patient can be reached at: Home number on file [phone] (home)    Best Time: anytime    Call taken on 2/24/2021 at 2:05 PM by Mica Lopez

## 2021-06-15 NOTE — TELEPHONE ENCOUNTER
I would really try to reschedule that second vaccine with whomever she got it through the first time.  It would make the process much easier for her

## 2021-06-15 NOTE — TELEPHONE ENCOUNTER
Reason for Call:  2nd Covid Vaccine Question    Detailed comments: Pt had to cancel her second dose of the vaccine today due to a family situation. 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 They told her that she will need to be put back on the wait list. She is having knee surgery on Monday is wondering if there is anyway that she can get her second dose here at  since its closer to her and since she will be on crutches.     Phone Number Patient can be reached at: Home number on file [phone] (home)    Best Time: anytime    Can we leave a detailed message on this number?: Yes    Call taken on 3/12/2021 at 10:41 AM by Mica Lopez

## 2021-06-16 PROBLEM — E55.9 VITAMIN D DEFICIENCY: Status: ACTIVE | Noted: 2017-11-06

## 2021-06-16 PROBLEM — E66.01 MORBID OBESITY (H): Status: ACTIVE | Noted: 2018-11-05

## 2021-06-16 PROBLEM — E66.9 OBESITY: Status: ACTIVE | Noted: 2017-11-06

## 2021-06-16 PROBLEM — I10 HYPERTENSION: Status: ACTIVE | Noted: 2017-11-06

## 2021-06-21 NOTE — PROGRESS NOTES
"Clinic Note    Assessment:     Assessment and Plan:  1. Health care maintenance  - Gynecologic Cytology (PAP Smear)       Patient Instructions   We should get your pap results back in the next week to 10 days. I will communicate the results back to you on MyChart.              Subjective:      Margo Jones is a 53 y.o. female who comes the clinic today for Pap smear.    She already saw her PCP, Dr. Caesar Russ for her regularly scheduled physical exam.  She is due for her Pap smear today.    She does not report any abnormal menstrual bleeding.  No abnormal discharge.  No vulvovaginal pain or discomfort of any kind.    Her last Pap smear was done in 2015 and was normal.    The following portions of the patient's history were reviewed and updated as appropriate: Allergies, medications, problem list, prior note.     Review of Systems:    Review is otherwise negative except for what is mentioned above.     Social Hx:    History   Smoking Status     Current Every Day Smoker     Packs/day: 0.25     Years: 30.00   Smokeless Tobacco     Not on file     Comment: Smokes 5 cigarettes daily, plan to quite after surgery         Objective:     Vitals:    11/05/18 1624   BP: 130/70   Pulse: 66   Weight: 214 lb 11.2 oz (97.4 kg)   Height: 5' 2.75\" (1.594 m)       Exam:    General: No apparent distress. Calm. Alert and Oriented X3. Pt behavior is appropriate.  Genitalia: Normal external female genitalia.  Normal vaginal vault.  Cervix appeared to be nonfriable.  Pap scrapings were done without issue or complication.      Patient Active Problem List   Diagnosis     Hypertension     Obesity     Vitamin D deficiency     Obesity (BMI 35.0-39.9) with comorbidity (H)     Current Outpatient Prescriptions   Medication Sig Dispense Refill     aspirin 81 mg chewable tablet Chew 81 mg daily.       ergocalciferol (ERGOCALCIFEROL) 50,000 unit capsule TK 1 C PO ONCE WEEKLY.  3     losartan (COZAAR) 25 MG tablet Take 25 mg by mouth daily.       " metFORMIN (GLUCOPHAGE-XR) 500 MG 24 hr tablet Take 2,000 mg by mouth bedtime.       multivitamin with minerals (THERA-M) 9 mg iron-400 mcg Tab tablet Take 1 tablet by mouth daily.       phentermine 30 MG capsule Take 45 mg by mouth every morning.       simvastatin (ZOCOR) 20 MG tablet Take 20 mg by mouth bedtime.       No current facility-administered medications for this visit.        I spent 25 minutes with patient face to face, of which >50% was counseling regarding the above plan       Ronald Mercado (Rob), FLETCHER    11/5/2018

## 2021-07-10 ENCOUNTER — HEALTH MAINTENANCE LETTER (OUTPATIENT)
Age: 56
End: 2021-07-10

## 2021-07-13 ENCOUNTER — RECORDS - HEALTHEAST (OUTPATIENT)
Dept: ADMINISTRATIVE | Facility: CLINIC | Age: 56
End: 2021-07-13

## 2021-07-13 ENCOUNTER — TRANSFERRED RECORDS (OUTPATIENT)
Dept: HEALTH INFORMATION MANAGEMENT | Facility: CLINIC | Age: 56
End: 2021-07-13

## 2021-07-14 PROBLEM — E11.9 DIABETES (H): Status: RESOLVED | Noted: 2017-11-06 | Resolved: 2017-11-06

## 2021-07-21 ENCOUNTER — RECORDS - HEALTHEAST (OUTPATIENT)
Dept: ADMINISTRATIVE | Facility: CLINIC | Age: 56
End: 2021-07-21

## 2021-07-22 ENCOUNTER — RECORDS - HEALTHEAST (OUTPATIENT)
Dept: FAMILY MEDICINE | Facility: CLINIC | Age: 56
End: 2021-07-22

## 2021-07-22 DIAGNOSIS — Z12.31 OTHER SCREENING MAMMOGRAM: ICD-10-CM

## 2021-09-04 ENCOUNTER — HEALTH MAINTENANCE LETTER (OUTPATIENT)
Age: 56
End: 2021-09-04

## 2021-09-14 ENCOUNTER — TRANSFERRED RECORDS (OUTPATIENT)
Dept: HEALTH INFORMATION MANAGEMENT | Facility: CLINIC | Age: 56
End: 2021-09-14

## 2021-10-30 ENCOUNTER — HEALTH MAINTENANCE LETTER (OUTPATIENT)
Age: 56
End: 2021-10-30

## 2021-11-15 ENCOUNTER — TELEPHONE (OUTPATIENT)
Dept: INTERNAL MEDICINE | Facility: CLINIC | Age: 56
End: 2021-11-15

## 2021-11-15 DIAGNOSIS — J32.9 SINUSITIS, UNSPECIFIED CHRONICITY, UNSPECIFIED LOCATION: Primary | ICD-10-CM

## 2021-11-15 NOTE — TELEPHONE ENCOUNTER
..Reason for Call:  Medication or medication refill:    Do you use a [facility]?  Name of the pharmacy and phone number for the current request: [id number] - [place], [place] - [address]  [phone]    Name of the medication requested: amoxicillin-clavulanate (AUGMENTIN) 875-125 mg per tablet    Other request: Patient having sinus symptoms and requesting medication to be sent to pharmacy.     Can we leave a detailed message on this number? YES    Phone number patient can be reached at: Home number on file [phone] (home)    Best Time: ANY    Call taken on 11/15/2021 at 1:27 PM by ANDREW Sahu

## 2022-01-11 ENCOUNTER — OFFICE VISIT (OUTPATIENT)
Dept: INTERNAL MEDICINE | Facility: CLINIC | Age: 57
End: 2022-01-11
Payer: COMMERCIAL

## 2022-01-11 VITALS
WEIGHT: 188.5 LBS | OXYGEN SATURATION: 99 % | DIASTOLIC BLOOD PRESSURE: 74 MMHG | BODY MASS INDEX: 33.66 KG/M2 | HEART RATE: 76 BPM | SYSTOLIC BLOOD PRESSURE: 122 MMHG

## 2022-01-11 DIAGNOSIS — I10 PRIMARY HYPERTENSION: ICD-10-CM

## 2022-01-11 DIAGNOSIS — L72.3 SEBACEOUS CYST: ICD-10-CM

## 2022-01-11 DIAGNOSIS — J32.0 CHRONIC MAXILLARY SINUSITIS: Primary | ICD-10-CM

## 2022-01-11 PROCEDURE — 87070 CULTURE OTHR SPECIMN AEROBIC: CPT | Performed by: INTERNAL MEDICINE

## 2022-01-11 PROCEDURE — 99214 OFFICE O/P EST MOD 30 MIN: CPT | Performed by: INTERNAL MEDICINE

## 2022-01-11 RX ORDER — AZELASTINE 1 MG/ML
1 SPRAY, METERED NASAL 2 TIMES DAILY
Qty: 30 ML | Refills: 4 | Status: SHIPPED | OUTPATIENT
Start: 2022-01-11 | End: 2022-10-11

## 2022-01-11 NOTE — PROGRESS NOTES
"  Assessment & Plan   Problem List Items Addressed This Visit     Hypertension      Other Visit Diagnoses     Chronic maxillary sinusitis    -  Primary    Relevant Medications    azelastine (ASTELIN) 0.1 % nasal spray    Sebaceous cyst        Relevant Orders    Skin Aerobic Bacterial Culture Routine           #1.  Chronic sinusitis.  I am going to have her stop her Claritin and we will substitute Allegra for this.  We will also have her stop her Flonase and she will start using azelastine which I called in for her today.  If she is not improved in 2 to 3 weeks she should call us back and we will consider getting a CT scan of her sinuses.    2.  Sebaceous cyst.  Again a culture was sent off.  I will await its results before starting her on antibiotics.         Tobacco Cessation:   reports that she has been smoking. She has a 7.50 pack-year smoking history. She has never used smokeless tobacco.          No follow-ups on file.    Caesar Russ MD  [facility]    Subjective     Leigh comes in today for evaluation of a couple issues.    1.  She states that she has had sinus congestion and plugged ears for about the last 2 to 3 months.  She is not using both Flonase and Claritin without relief, and she has been on 2 courses of antibiotics (Augmentin and azithromycin) without improvement.  She states that she has congestion in the maxillary sinus area and feels that her ear \"pop\".  She been having a lot of of postnasal drainage as well.  He has not been blowing her nose.  No productive cough.  She has tested herself twice for COVID both of which have been negative.  No fevers.    2. She states that there is a \"cyst\" near her underwear lying on the left on her posterior thigh.  She states that this is been present for a couple of months as well.  She states that it will occasionally drain pus like material and then will stop bothering her but always tends to recur.  She is wondering if there " is a way to get rid of this completely.      Objective    /74   Pulse 76   Wt 85.5 kg (188 lb 8 oz)   SpO2 99%   BMI 33.66 kg/m    Body mass index is 33.66 kg/m .  Physical Exam     HEENT: Oropharynx is clear.  No tenderness to percussion of the sinuses.  There is tympanic membrane retraction bilaterally, right greater than left.  No anterior posterior cervical lymphadenopathy.    Skin: On the left posterior thigh there is approximately a 1cm erythematous/violaceous lesion which expresses purulence when it is touched.  This was sent for culture.

## 2022-01-12 ENCOUNTER — MYC MEDICAL ADVICE (OUTPATIENT)
Dept: INTERNAL MEDICINE | Facility: CLINIC | Age: 57
End: 2022-01-12
Payer: COMMERCIAL

## 2022-01-12 DIAGNOSIS — L72.3 SEBACEOUS CYST: Primary | ICD-10-CM

## 2022-01-13 LAB — BACTERIA SKIN AEROBE CULT: NORMAL

## 2022-01-13 RX ORDER — SULFAMETHOXAZOLE/TRIMETHOPRIM 800-160 MG
1 TABLET ORAL 2 TIMES DAILY
Qty: 20 TABLET | Refills: 0 | Status: SHIPPED | OUTPATIENT
Start: 2022-01-13 | End: 2022-01-23

## 2022-04-21 ENCOUNTER — TRANSFERRED RECORDS (OUTPATIENT)
Dept: HEALTH INFORMATION MANAGEMENT | Facility: CLINIC | Age: 57
End: 2022-04-21
Payer: COMMERCIAL

## 2022-05-19 ENCOUNTER — TRANSFERRED RECORDS (OUTPATIENT)
Dept: HEALTH INFORMATION MANAGEMENT | Facility: CLINIC | Age: 57
End: 2022-05-19
Payer: COMMERCIAL

## 2022-10-11 ENCOUNTER — OFFICE VISIT (OUTPATIENT)
Dept: INTERNAL MEDICINE | Facility: CLINIC | Age: 57
End: 2022-10-11
Payer: COMMERCIAL

## 2022-10-11 VITALS
SYSTOLIC BLOOD PRESSURE: 132 MMHG | DIASTOLIC BLOOD PRESSURE: 82 MMHG | HEIGHT: 63 IN | WEIGHT: 177 LBS | BODY MASS INDEX: 31.36 KG/M2 | OXYGEN SATURATION: 99 % | HEART RATE: 67 BPM

## 2022-10-11 DIAGNOSIS — L50.9 URTICARIA: ICD-10-CM

## 2022-10-11 DIAGNOSIS — M79.641 BILATERAL HAND PAIN: Primary | ICD-10-CM

## 2022-10-11 DIAGNOSIS — M79.642 BILATERAL HAND PAIN: Primary | ICD-10-CM

## 2022-10-11 LAB
CRP SERPL-MCNC: <3 MG/L
ERYTHROCYTE [SEDIMENTATION RATE] IN BLOOD BY WESTERGREN METHOD: 9 MM/HR (ref 0–20)

## 2022-10-11 PROCEDURE — 86235 NUCLEAR ANTIGEN ANTIBODY: CPT | Performed by: INTERNAL MEDICINE

## 2022-10-11 PROCEDURE — 86431 RHEUMATOID FACTOR QUANT: CPT | Performed by: INTERNAL MEDICINE

## 2022-10-11 PROCEDURE — 86140 C-REACTIVE PROTEIN: CPT | Performed by: INTERNAL MEDICINE

## 2022-10-11 PROCEDURE — 36415 COLL VENOUS BLD VENIPUNCTURE: CPT | Performed by: INTERNAL MEDICINE

## 2022-10-11 PROCEDURE — 99214 OFFICE O/P EST MOD 30 MIN: CPT | Performed by: INTERNAL MEDICINE

## 2022-10-11 PROCEDURE — 85652 RBC SED RATE AUTOMATED: CPT | Performed by: INTERNAL MEDICINE

## 2022-10-11 PROCEDURE — 86200 CCP ANTIBODY: CPT | Performed by: INTERNAL MEDICINE

## 2022-10-11 RX ORDER — TRIAMCINOLONE ACETONIDE 1 MG/G
CREAM TOPICAL 2 TIMES DAILY
Qty: 45 G | Refills: 1 | Status: SHIPPED | OUTPATIENT
Start: 2022-10-11

## 2022-10-11 ASSESSMENT — ANXIETY QUESTIONNAIRES
2. NOT BEING ABLE TO STOP OR CONTROL WORRYING: MORE THAN HALF THE DAYS
6. BECOMING EASILY ANNOYED OR IRRITABLE: NOT AT ALL
1. FEELING NERVOUS, ANXIOUS, OR ON EDGE: MORE THAN HALF THE DAYS
GAD7 TOTAL SCORE: 9
5. BEING SO RESTLESS THAT IT IS HARD TO SIT STILL: NOT AT ALL
GAD7 TOTAL SCORE: 9
7. FEELING AFRAID AS IF SOMETHING AWFUL MIGHT HAPPEN: NEARLY EVERY DAY
IF YOU CHECKED OFF ANY PROBLEMS ON THIS QUESTIONNAIRE, HOW DIFFICULT HAVE THESE PROBLEMS MADE IT FOR YOU TO DO YOUR WORK, TAKE CARE OF THINGS AT HOME, OR GET ALONG WITH OTHER PEOPLE: SOMEWHAT DIFFICULT
3. WORRYING TOO MUCH ABOUT DIFFERENT THINGS: SEVERAL DAYS

## 2022-10-11 ASSESSMENT — PATIENT HEALTH QUESTIONNAIRE - PHQ9: 5. POOR APPETITE OR OVEREATING: SEVERAL DAYS

## 2022-10-11 NOTE — PROGRESS NOTES
Assessment & Plan   Problem List Items Addressed This Visit    None  Visit Diagnoses     Bilateral hand pain    -  Primary    Relevant Medications    triamcinolone (KENALOG) 0.1 % external cream    Other Relevant Orders    CRP, inflammation    ESR: Erythrocyte sedimentation rate    Cyclic Citrullinated Peptide Antibody IgG    Rheumatoid factor    STEFFI antibody panel    Urticaria        Relevant Medications    triamcinolone (KENALOG) 0.1 % external cream           #1.  Bilateral hand pain.  We will check a CRP, sed rate, anti-CCP antibody, rheumatoid factor, and an STEFFI panel.  I will contact her with results and further recommendations.    2.  Residual urticaria.  I prescribed her triamcinolone cream to use.  If she is not improved in 7 to 10 days we should see her back.    As far as her other issues were concerned I told her that I would be happy to see her at another visit.  I have appointments next week that I would be happy to see her in.    30 minutes were spent in total in this encounter, including precharting, assessment, diagnosis, and documentation.    No follow-ups on file.    Caesar Russ MD  [facility]    Subjective     Leigh comes in today for evaluation of a couple of issues.  She had about 5 issues listed today, but I told her that due to time limitations we could only address her top two priorities.    She states that she has been having some hand pain and swelling for about the last 5 months.  Pain gets worse at night and then in the morning when she wakes up.  She localizes the pain to the bilateral MCP joints.  She states that she will have some mild swelling that improves throughout the day.  No history of autoimmunity in the family.  She states that she is going to be worked up for rheumatoid arthritis in the past but apparently something fell through with the physician who was going to order this and this never happened.  She is looking to do this today.  No  other joint pain or swelling.  No fevers.    She states that she was diagnosed with hives about a month ago and was given a course of oral prednisone.  She states that this helped significantly, but she still has a couple of areas on her skin, 1 on the right lateral arm and 1 behind the left ear, that are still quite pruritic.  She has been using essential oils on the area which have not been working.  Again no fevers or other skin problems.        Objective    There were no vitals taken for this visit.  There is no height or weight on file to calculate BMI.  Physical Exam

## 2022-10-12 LAB
CCP AB SER IA-ACNC: 0.6 U/ML
ENA SM IGG SER IA-ACNC: 2.4 U/ML
ENA SM IGG SER IA-ACNC: NEGATIVE
ENA SS-A AB SER IA-ACNC: <0.5 U/ML
ENA SS-A AB SER IA-ACNC: NEGATIVE
ENA SS-B IGG SER IA-ACNC: <0.6 U/ML
ENA SS-B IGG SER IA-ACNC: NEGATIVE
RHEUMATOID FACT SER NEPH-ACNC: 12 IU/ML
U1 SNRNP IGG SER IA-ACNC: 1.1 U/ML
U1 SNRNP IGG SER IA-ACNC: NEGATIVE

## 2022-10-16 ENCOUNTER — HEALTH MAINTENANCE LETTER (OUTPATIENT)
Age: 57
End: 2022-10-16

## 2022-10-18 ENCOUNTER — HOSPITAL ENCOUNTER (OUTPATIENT)
Dept: MAMMOGRAPHY | Facility: CLINIC | Age: 57
Discharge: HOME | End: 2022-10-18
Attending: INTERNAL MEDICINE | Admitting: INTERNAL MEDICINE
Payer: COMMERCIAL

## 2022-10-18 DIAGNOSIS — Z12.31 VISIT FOR SCREENING MAMMOGRAM: ICD-10-CM

## 2022-10-18 PROCEDURE — 77067 SCR MAMMO BI INCL CAD: CPT

## 2022-12-04 ENCOUNTER — HEALTH MAINTENANCE LETTER (OUTPATIENT)
Age: 57
End: 2022-12-04

## 2023-02-27 ENCOUNTER — TRANSFERRED RECORDS (OUTPATIENT)
Dept: HEALTH INFORMATION MANAGEMENT | Facility: CLINIC | Age: 58
End: 2023-02-27

## 2023-05-05 ENCOUNTER — TRANSFERRED RECORDS (OUTPATIENT)
Dept: HEALTH INFORMATION MANAGEMENT | Facility: CLINIC | Age: 58
End: 2023-05-05
Payer: COMMERCIAL

## 2023-08-01 ENCOUNTER — TRANSFERRED RECORDS (OUTPATIENT)
Dept: HEALTH INFORMATION MANAGEMENT | Facility: CLINIC | Age: 58
End: 2023-08-01
Payer: COMMERCIAL

## 2024-01-12 ENCOUNTER — HOSPITAL ENCOUNTER (OUTPATIENT)
Dept: MAMMOGRAPHY | Facility: CLINIC | Age: 59
Discharge: HOME | End: 2024-01-12
Attending: INTERNAL MEDICINE | Admitting: INTERNAL MEDICINE
Payer: COMMERCIAL

## 2024-01-12 DIAGNOSIS — Z12.31 VISIT FOR SCREENING MAMMOGRAM: ICD-10-CM

## 2024-01-12 PROCEDURE — 77063 BREAST TOMOSYNTHESIS BI: CPT

## 2024-01-13 ENCOUNTER — HEALTH MAINTENANCE LETTER (OUTPATIENT)
Age: 59
End: 2024-01-13

## 2024-03-12 ENCOUNTER — TRANSFERRED RECORDS (OUTPATIENT)
Dept: HEALTH INFORMATION MANAGEMENT | Facility: CLINIC | Age: 59
End: 2024-03-12
Payer: COMMERCIAL

## 2024-08-21 ENCOUNTER — TRANSFERRED RECORDS (OUTPATIENT)
Dept: HEALTH INFORMATION MANAGEMENT | Facility: CLINIC | Age: 59
End: 2024-08-21
Payer: COMMERCIAL

## 2025-01-26 ENCOUNTER — HEALTH MAINTENANCE LETTER (OUTPATIENT)
Age: 60
End: 2025-01-26